# Patient Record
Sex: FEMALE | Race: WHITE | NOT HISPANIC OR LATINO
[De-identification: names, ages, dates, MRNs, and addresses within clinical notes are randomized per-mention and may not be internally consistent; named-entity substitution may affect disease eponyms.]

---

## 2018-11-16 ENCOUNTER — TRANSCRIPTION ENCOUNTER (OUTPATIENT)
Age: 28
End: 2018-11-16

## 2019-02-25 ENCOUNTER — TRANSCRIPTION ENCOUNTER (OUTPATIENT)
Age: 29
End: 2019-02-25

## 2019-08-18 ENCOUNTER — TRANSCRIPTION ENCOUNTER (OUTPATIENT)
Age: 29
End: 2019-08-18

## 2019-08-27 ENCOUNTER — TRANSCRIPTION ENCOUNTER (OUTPATIENT)
Age: 29
End: 2019-08-27

## 2020-01-05 ENCOUNTER — TRANSCRIPTION ENCOUNTER (OUTPATIENT)
Age: 30
End: 2020-01-05

## 2023-03-24 PROBLEM — Z00.00 ENCOUNTER FOR PREVENTIVE HEALTH EXAMINATION: Status: ACTIVE | Noted: 2023-03-24

## 2023-03-27 ENCOUNTER — APPOINTMENT (OUTPATIENT)
Dept: ORTHOPEDIC SURGERY | Facility: CLINIC | Age: 33
End: 2023-03-27
Payer: COMMERCIAL

## 2023-03-27 VITALS — WEIGHT: 248 LBS | BODY MASS INDEX: 38.92 KG/M2 | HEIGHT: 67 IN

## 2023-03-27 DIAGNOSIS — Z78.9 OTHER SPECIFIED HEALTH STATUS: ICD-10-CM

## 2023-03-27 DIAGNOSIS — Z86.39 PERSONAL HISTORY OF OTHER ENDOCRINE, NUTRITIONAL AND METABOLIC DISEASE: ICD-10-CM

## 2023-03-27 DIAGNOSIS — S92.514A NONDISPLACED FRACTURE OF PROXIMAL PHALANX OF RIGHT LESSER TOE(S), INITIAL ENCOUNTER FOR CLOSED FRACTURE: ICD-10-CM

## 2023-03-27 DIAGNOSIS — Z87.42 PERSONAL HISTORY OF OTHER DISEASES OF THE FEMALE GENITAL TRACT: ICD-10-CM

## 2023-03-27 DIAGNOSIS — Z86.59 PERSONAL HISTORY OF OTHER MENTAL AND BEHAVIORAL DISORDERS: ICD-10-CM

## 2023-03-27 PROCEDURE — 99203 OFFICE O/P NEW LOW 30 MIN: CPT

## 2023-03-27 NOTE — PHYSICAL EXAM
[Mild] : mild swelling of toe(s) [5th] : 5th [2+] : posterior tibialis pulse: 2+ [Normal] : saphenous nerve sensation normal [Right] : right foot [Outside films reviewed] : Outside films reviewed [Fracture] : Fracture [] : not mildly antalgic [FreeTextEntry3] : 5th digit  [de-identified] : base fracture of proximal phalanx intrarticular and mild displacement.

## 2023-03-27 NOTE — REASON FOR VISIT
[FreeTextEntry2] : PATIENT IS HERE FOR RIGHT FOOT PINKY TOE INJURY WALKED INTO CORNER OF THE STOVE AT HOME. PATIENT WENT TO URGENT CARE IN , WAS TREATED AND HAD XRAY. PATIENT TOE GOT TAPED AND SUGGESTED TO SEE OUR DOCTOR

## 2023-03-27 NOTE — ASSESSMENT
[FreeTextEntry1] : WEAR OPEN TOE SHOE OR DEEP WIDE SHOE.\par RTO 4 WEEKS FOR X RAY. \par VITAMIN D AND CALCIUM.

## 2024-12-28 ENCOUNTER — NON-APPOINTMENT (OUTPATIENT)
Age: 34
End: 2024-12-28